# Patient Record
Sex: MALE | Race: BLACK OR AFRICAN AMERICAN | NOT HISPANIC OR LATINO | Employment: FULL TIME | URBAN - METROPOLITAN AREA
[De-identification: names, ages, dates, MRNs, and addresses within clinical notes are randomized per-mention and may not be internally consistent; named-entity substitution may affect disease eponyms.]

---

## 2021-12-29 ENCOUNTER — OCCMED (OUTPATIENT)
Dept: URGENT CARE | Facility: CLINIC | Age: 46
End: 2021-12-29
Payer: OTHER MISCELLANEOUS

## 2021-12-29 ENCOUNTER — APPOINTMENT (OUTPATIENT)
Dept: RADIOLOGY | Facility: CLINIC | Age: 46
End: 2021-12-29
Payer: OTHER MISCELLANEOUS

## 2021-12-29 DIAGNOSIS — T14.90XA OCCUPATIONAL INJURY: ICD-10-CM

## 2021-12-29 DIAGNOSIS — T14.90XA OCCUPATIONAL INJURY: Primary | ICD-10-CM

## 2021-12-29 PROCEDURE — 99283 EMERGENCY DEPT VISIT LOW MDM: CPT | Performed by: PHYSICIAN ASSISTANT

## 2021-12-29 PROCEDURE — 72100 X-RAY EXAM L-S SPINE 2/3 VWS: CPT

## 2021-12-29 PROCEDURE — G0382 LEV 3 HOSP TYPE B ED VISIT: HCPCS | Performed by: PHYSICIAN ASSISTANT

## 2022-01-07 ENCOUNTER — OCCMED (OUTPATIENT)
Dept: URGENT CARE | Facility: CLINIC | Age: 47
End: 2022-01-07
Payer: OTHER MISCELLANEOUS

## 2022-01-07 DIAGNOSIS — T14.90XA OCCUPATIONAL INJURY: Primary | ICD-10-CM

## 2022-01-07 PROCEDURE — 99213 OFFICE O/P EST LOW 20 MIN: CPT | Performed by: PHYSICIAN ASSISTANT

## 2022-01-12 ENCOUNTER — EVALUATION (OUTPATIENT)
Dept: PHYSICAL THERAPY | Facility: CLINIC | Age: 47
End: 2022-01-12
Payer: OTHER MISCELLANEOUS

## 2022-01-12 DIAGNOSIS — S39.012A BACK STRAIN, INITIAL ENCOUNTER: Primary | ICD-10-CM

## 2022-01-12 PROCEDURE — 97110 THERAPEUTIC EXERCISES: CPT | Performed by: PHYSICAL THERAPIST

## 2022-01-12 PROCEDURE — 97161 PT EVAL LOW COMPLEX 20 MIN: CPT | Performed by: PHYSICAL THERAPIST

## 2022-01-12 NOTE — PROGRESS NOTES
PT Evaluation  and PT Discharge    Today's date: 2022  Patient name: Josee Joseph  : 1975  MRN: [id number]  Referring provider: Karen Fam PA-C  Dx:   Encounter Diagnosis     ICD-10-CM    1  Back strain, initial encounter  S39 012A                   Assessment  Assessment details: Patient was provided a home exercise program and demonstrated an understanding of exercises  Patient was advised to stop performing home exercise program if symptoms increase or new complaints developed  Verbal understanding demonstrated regarding home exercise program instructions  Patient presents in good standing today - " painfree" for over a week at this point  He presents for HEP per his report - to get some exercises to compliment his current regimen    Evaluation reveals patient in good standing, painfree  Assessment reveals:  Good strength, functional trunk mobility, minor loss of flexibility bilateral HS quads  Patient was issued a written HEP as well as BTB  - he demonstrates competency with HEP   Skilled PT is not indicated at this time      Impairments: lacks appropriate home exercise program  Understanding of Dx/Px/POC: good  Goals  STG   1  Patient will demonstrate independence and competence with HEP - MET      Plan  Plan details: Thank you for this referral    Planned therapy interventions: home exercise program  Treatment plan discussed with: patient        Subjective Evaluation    History of Present Illness  Mechanism of injury: Patient is a  -  2021 He was carrying too many signs ( something that he has done prior ) overhead   He went to drop the signs down to his left and he felt right "stabbing pain" in LS/ glut region -  He went down to the ground and did stretching and felt a little better  Continued to work and became more and more tight as time progressed    He took some motrin which helped slightly   At home, he went to sit and his pain returned with increased discomfort  He went to [facility] the following day  - issued m  Relaxors  12- - he was walking hunched over due to continued pain   He performed a dktc and was rocking back and forth for 20-30 min - also ff in seated positioning  He felt much better after performing these exercises - being able to move around more naturally   He came today "just for exercises"   "I am back to normal"    Pain  No pain reported  Progression: improved    Social Support  Lives with: spouse    Treatments  Current treatment: medication  Patient Goals  Patient goal: " i just would like some exercises to compliment the ones im doing "         Objective     Concurrent Complaints  Negative for night pain, disturbed sleep, bladder dysfunction, bowel dysfunction and saddle (S4) numbness    Postural Observations  Seated posture: fair  Standing posture: good  Correction of posture: has no consistent effect        Tenderness     Lumbar Spine  No tenderness in the spinous process  Left Hip   No tenderness in the PSIS  Right Hip   No tenderness in the PSIS       Neurological Testing     Sensation     Lumbar   Left   Intact: light touch    Right   Intact: light touch    Active Range of Motion     Lumbar   Flexion:  Restriction level: minimal  Extension:  WFL    Joint Play     Hypomobile: L3, L4 and L5   Mechanical Assessment    Cervical      Thoracic      Lumbar    Standing flexion: repeated movements   Pain location:no change  Lying flexion: repeated movements  Pain location: no change  Standing extension: repeated movements  Pain location: no change  Lying extension: repeated movements  Pain location: no change    Strength/Myotome Testing     Lumbar   Left   Normal strength    Right   Normal strength    Tests     Lumbar     Left   Negative crossed SLR, femoral stretch, passive SLR and slump test      Right   Negative crossed SLR, femoral stretch, passive SLR and slump test      General Comments:    Lower quarter screen   Hips: unremarkable    Lumbar Comments  Gait - non-antalgic             Precautions: LBP - 12-  ( RESOLVED Per patient )    [employer]  Access Code: [email or web address]        Manuals 1/12            FOTO  db                                                   Neuro Re-Ed                          hss w/ strap 20s x 5            Quad stretch w/ strap 20s x 5                         Seated IR / ER hip stretch 20s x 3 ea                                                                              Ther Ex             ltr 5s x 10            ppt 3s x 20            Prone press up 10x 2            Loc and sag 10x 2                         Clamshells w/ TB btb x 30                                       Ther Activity                                       Gait Training                                       Modalities

## 2022-01-12 NOTE — LETTER
2022    Silvia Sainz PA-C  [address] [address]    Patient: Abdullahi Alvarez   YOB: 1975   Date of Visit: 2022     Encounter Diagnosis     ICD-10-CM    1  Back strain, initial encounter  S39 012A        Dear Dr Alyson Lanier: Thank you for your recent referral of Abdullahi Alvarez  Please review the attached evaluation summary from Kinte's recent visit  Please verify that you agree with the plan of care by signing the attached order  If you have any questions or concerns, please do not hesitate to call  I sincerely appreciate the opportunity to share in the care of one of your patients and hope to have another opportunity to work with you in the near future  Sincerely,    Goyo Graf, PT      Referring Provider:      I certify that I have read the below Plan of Care and certify the need for these services furnished under this plan of treatment while under my care  Silvia Sainz PA-C  [address] [address]  Via Fax: [phone]          PT Evaluation  and PT Discharge    Today's date: 2022  Patient name: Abdullahi Alvarez  : 1975  MRN: [id number]  Referring provider: Kin Kirk PA-C  Dx:   Encounter Diagnosis     ICD-10-CM    1  Back strain, initial encounter  S39 012A                   Assessment  Assessment details: Patient was provided a home exercise program and demonstrated an understanding of exercises  Patient was advised to stop performing home exercise program if symptoms increase or new complaints developed  Verbal understanding demonstrated regarding home exercise program instructions  Patient presents in good standing today - " painfree" for over a week at this point  He presents for HEP per his report - to get some exercises to compliment his current regimen    Evaluation reveals patient in good standing, painfree     Assessment reveals:  Good strength, functional trunk mobility, minor loss of flexibility bilateral HS quads  Patient was issued a written HEP as well as BTB  - he demonstrates competency with HEP   Skilled PT is not indicated at this time      Impairments: lacks appropriate home exercise program  Understanding of Dx/Px/POC: good  Goals  STG   1  Patient will demonstrate independence and competence with HEP - MET      Plan  Plan details: Thank you for this referral    Planned therapy interventions: home exercise program  Treatment plan discussed with: patient        Subjective Evaluation    History of Present Illness  Mechanism of injury: Patient is a  -  12- He was carrying too many signs ( something that he has done prior ) overhead   He went to drop the signs down to his left and he felt right "stabbing pain" in LS/ glut region -  He went down to the ground and did stretching and felt a little better  Continued to work and became more and more tight as time progressed  He took some motrin which helped slightly   At home, he went to sit and his pain returned with increased discomfort  He went to [facility] the following day  - issued m  Relaxors  12- - he was walking hunched over due to continued pain   He performed a dktc and was rocking back and forth for 20-30 min - also ff in seated positioning  He felt much better after performing these exercises - being able to move around more naturally       He came today "just for exercises"   "I am back to normal"    Pain  No pain reported  Progression: improved    Social Support  Lives with: spouse    Treatments  Current treatment: medication  Patient Goals  Patient goal: " i just would like some exercises to compliment the ones im doing "         Objective     Concurrent Complaints  Negative for night pain, disturbed sleep, bladder dysfunction, bowel dysfunction and saddle (S4) numbness    Postural Observations  Seated posture: fair  Standing posture: good  Correction of posture: has no consistent effect        Tenderness Lumbar Spine  No tenderness in the spinous process  Left Hip   No tenderness in the PSIS  Right Hip   No tenderness in the PSIS  Neurological Testing     Sensation     Lumbar   Left   Intact: light touch    Right   Intact: light touch    Active Range of Motion     Lumbar   Flexion:  Restriction level: minimal  Extension:  WFL    Joint Play     Hypomobile: L3, L4 and L5   Mechanical Assessment    Cervical      Thoracic      Lumbar    Standing flexion: repeated movements   Pain location:no change  Lying flexion: repeated movements  Pain location: no change  Standing extension: repeated movements  Pain location: no change  Lying extension: repeated movements  Pain location: no change    Strength/Myotome Testing     Lumbar   Left   Normal strength    Right   Normal strength    Tests     Lumbar     Left   Negative crossed SLR, femoral stretch, passive SLR and slump test      Right   Negative crossed SLR, femoral stretch, passive SLR and slump test      General Comments:    Lower quarter screen   Hips: unremarkable    Lumbar Comments  Gait - non-antalgic             Precautions: LBP - 12-  ( RESOLVED Per patient )    [employer]  Access Code: [email or web address]        Manuals 1/12            FOTO  db                                                   Neuro Re-Ed                          hss w/ strap 20s x 5            Quad stretch w/ strap 20s x 5                         Seated IR / ER hip stretch 20s x 3 ea                                                                              Ther Ex             ltr 5s x 10            ppt 3s x 20            Prone press up 10x 2            Loc and sag 10x 2                         Clamshells w/ TB btb x 30                                       Ther Activity                                       Gait Training                                       Modalities